# Patient Record
Sex: MALE | Race: WHITE | URBAN - METROPOLITAN AREA
[De-identification: names, ages, dates, MRNs, and addresses within clinical notes are randomized per-mention and may not be internally consistent; named-entity substitution may affect disease eponyms.]

---

## 2024-08-30 ENCOUNTER — OFFICE VISIT (OUTPATIENT)
Age: 34
End: 2024-08-30

## 2024-08-30 VITALS
HEART RATE: 86 BPM | BODY MASS INDEX: 35.68 KG/M2 | TEMPERATURE: 97.4 F | HEIGHT: 70 IN | OXYGEN SATURATION: 94 % | SYSTOLIC BLOOD PRESSURE: 128 MMHG | WEIGHT: 249.2 LBS | DIASTOLIC BLOOD PRESSURE: 84 MMHG

## 2024-08-30 DIAGNOSIS — R60.0 EDEMA OF LEFT LOWER EXTREMITY: ICD-10-CM

## 2024-08-30 DIAGNOSIS — L03.116 CELLULITIS OF LEFT LOWER EXTREMITY: Primary | ICD-10-CM

## 2024-08-30 RX ORDER — DOXYCYCLINE HYCLATE 100 MG
100 TABLET ORAL 2 TIMES DAILY
Qty: 14 TABLET | Refills: 0 | Status: SHIPPED | OUTPATIENT
Start: 2024-08-30 | End: 2024-09-06

## 2024-08-30 ASSESSMENT — ENCOUNTER SYMPTOMS
CHEST TIGHTNESS: 0
SHORTNESS OF BREATH: 0

## 2024-08-30 NOTE — PROGRESS NOTES
Harry Britton (:  1990) is a 34 y.o. male,New patient, here for evaluation of the following chief complaint(s):  Leg Swelling (Leg swelling and redness since Monday. Patient went swimming on Saturday,  woke up to calf pain. )      ASSESSMENT/PLAN:    ICD-10-CM    1. Cellulitis of left lower extremity  L03.116 doxycycline hyclate (VIBRA-TABS) 100 MG tablet      2. Edema of left lower extremity  R60.0 Vascular duplex lower extremity venous left        Patient is experiencing redness and swelling to his left lower leg. Denies any known injury or bite. Suspect of cellulitis based on warmth, redness, pattern, and lack of positive DVT signs/causes. He was started on Doxycycline at this time. Encouraged them to elevate it as much as possible, ice, and compression while awake. Ibuprofen for pain. If symptoms have remain unchanged by Monday, he will need to have the outpatient doppler performed. If symptoms worsen, he will need to be seen in the ER immediately. Patient and his wife are understanding and agreeable to this plan.     Dx Disposition: cellulitis, DVT  Education and handout provided on diagnosis and management of symptoms.   AVS reviewed with patient. Follow up as needed in UC or with PCP for new or worsening symptoms.   Return if symptoms worsen or fail to improve.    SUBJECTIVE/OBJECTIVE:  Patient presents to the clinic with complaints of swelling and redness to left leg. This started . Painful and hard on Monday. Hard to walk the first couple of days. Went swimming on Saturday. Pain and hardness has decreased over the week. Felt \"feverish\" on Tuesday night. Denies any body aches. He has tried ibuprofen with some relief.          History provided by:  Patient and EMS personnel   used: No        Vitals:    24 1758   BP: 128/84   Site: Left Upper Arm   Position: Sitting   Cuff Size: Medium Adult   Pulse: 86   Temp: 97.4 °F (36.3 °C)   TempSrc: Oral   SpO2: 94%